# Patient Record
Sex: MALE | Race: WHITE | NOT HISPANIC OR LATINO | Employment: FULL TIME | URBAN - METROPOLITAN AREA
[De-identification: names, ages, dates, MRNs, and addresses within clinical notes are randomized per-mention and may not be internally consistent; named-entity substitution may affect disease eponyms.]

---

## 2024-06-21 ENCOUNTER — TRANSCRIBE ORDERS (OUTPATIENT)
Dept: PHYSICAL THERAPY | Facility: CLINIC | Age: 29
End: 2024-06-21
Payer: OTHER MISCELLANEOUS

## 2024-06-21 DIAGNOSIS — S63.502A LEFT WRIST SPRAIN, INITIAL ENCOUNTER: Primary | ICD-10-CM

## 2024-06-27 ENCOUNTER — TREATMENT (OUTPATIENT)
Dept: PHYSICAL THERAPY | Facility: CLINIC | Age: 29
End: 2024-06-27
Payer: OTHER MISCELLANEOUS

## 2024-06-27 DIAGNOSIS — S63.502A SPRAIN OF LEFT WRIST, INITIAL ENCOUNTER: Primary | ICD-10-CM

## 2024-06-27 DIAGNOSIS — M25.532 LEFT WRIST PAIN: ICD-10-CM

## 2024-07-01 ENCOUNTER — TREATMENT (OUTPATIENT)
Dept: PHYSICAL THERAPY | Facility: CLINIC | Age: 29
End: 2024-07-01
Payer: OTHER MISCELLANEOUS

## 2024-07-01 DIAGNOSIS — M25.532 LEFT WRIST PAIN: ICD-10-CM

## 2024-07-01 DIAGNOSIS — S63.502D SPRAIN OF LEFT WRIST, SUBSEQUENT ENCOUNTER: Primary | ICD-10-CM

## 2024-07-03 ENCOUNTER — TREATMENT (OUTPATIENT)
Dept: PHYSICAL THERAPY | Facility: CLINIC | Age: 29
End: 2024-07-03
Payer: OTHER MISCELLANEOUS

## 2024-07-03 DIAGNOSIS — M25.532 LEFT WRIST PAIN: ICD-10-CM

## 2024-07-03 DIAGNOSIS — S63.502D SPRAIN OF LEFT WRIST, SUBSEQUENT ENCOUNTER: Primary | ICD-10-CM

## 2024-07-16 ENCOUNTER — TREATMENT (OUTPATIENT)
Dept: PHYSICAL THERAPY | Facility: CLINIC | Age: 29
End: 2024-07-16
Payer: OTHER MISCELLANEOUS

## 2024-07-16 DIAGNOSIS — S63.502D SPRAIN OF LEFT WRIST, SUBSEQUENT ENCOUNTER: Primary | ICD-10-CM

## 2024-07-16 DIAGNOSIS — M25.532 LEFT WRIST PAIN: ICD-10-CM

## 2024-08-07 ENCOUNTER — TREATMENT (OUTPATIENT)
Dept: PHYSICAL THERAPY | Facility: CLINIC | Age: 29
End: 2024-08-07
Payer: OTHER MISCELLANEOUS

## 2024-08-07 DIAGNOSIS — M25.532 LEFT WRIST PAIN: ICD-10-CM

## 2024-08-07 DIAGNOSIS — S63.502D SPRAIN OF LEFT WRIST, SUBSEQUENT ENCOUNTER: Primary | ICD-10-CM

## 2024-08-13 ENCOUNTER — OFFICE VISIT (OUTPATIENT)
Dept: ORTHOPEDIC SURGERY | Facility: CLINIC | Age: 29
End: 2024-08-13
Payer: OTHER MISCELLANEOUS

## 2024-08-13 VITALS
WEIGHT: 250 LBS | SYSTOLIC BLOOD PRESSURE: 165 MMHG | BODY MASS INDEX: 35 KG/M2 | OXYGEN SATURATION: 98 % | HEIGHT: 71 IN | HEART RATE: 80 BPM | DIASTOLIC BLOOD PRESSURE: 99 MMHG

## 2024-08-13 DIAGNOSIS — M77.8 LEFT WRIST TENDONITIS: ICD-10-CM

## 2024-08-13 DIAGNOSIS — S63.502A SPRAIN OF LEFT WRIST, INITIAL ENCOUNTER: ICD-10-CM

## 2024-08-13 DIAGNOSIS — M25.532 LEFT WRIST PAIN: Primary | ICD-10-CM

## 2024-08-13 PROCEDURE — 99203 OFFICE O/P NEW LOW 30 MIN: CPT | Performed by: ORTHOPAEDIC SURGERY

## 2024-08-13 RX ORDER — DICLOFENAC SODIUM 75 MG/1
1 TABLET, DELAYED RELEASE ORAL EVERY 12 HOURS SCHEDULED
COMMUNITY
Start: 2024-07-08

## 2024-08-15 ENCOUNTER — TELEPHONE (OUTPATIENT)
Dept: ORTHOPEDIC SURGERY | Facility: CLINIC | Age: 29
End: 2024-08-15
Payer: OTHER MISCELLANEOUS

## 2024-08-15 DIAGNOSIS — M77.8 LEFT WRIST TENDONITIS: ICD-10-CM

## 2024-08-15 DIAGNOSIS — M25.532 LEFT WRIST PAIN: Primary | ICD-10-CM

## 2024-08-15 DIAGNOSIS — S63.502A SPRAIN OF LEFT WRIST, INITIAL ENCOUNTER: ICD-10-CM

## 2024-08-19 ENCOUNTER — TREATMENT (OUTPATIENT)
Dept: PHYSICAL THERAPY | Facility: CLINIC | Age: 29
End: 2024-08-19
Payer: OTHER MISCELLANEOUS

## 2024-08-19 DIAGNOSIS — S63.502D SPRAIN OF LEFT WRIST, SUBSEQUENT ENCOUNTER: Primary | ICD-10-CM

## 2024-08-19 DIAGNOSIS — M25.532 LEFT WRIST PAIN: ICD-10-CM

## 2024-08-19 PROCEDURE — 97112 NEUROMUSCULAR REEDUCATION: CPT | Performed by: PHYSICAL THERAPIST

## 2024-08-19 PROCEDURE — 97110 THERAPEUTIC EXERCISES: CPT | Performed by: PHYSICAL THERAPIST

## 2024-08-19 PROCEDURE — 97530 THERAPEUTIC ACTIVITIES: CPT | Performed by: PHYSICAL THERAPIST

## 2024-08-23 ENCOUNTER — TREATMENT (OUTPATIENT)
Dept: PHYSICAL THERAPY | Facility: CLINIC | Age: 29
End: 2024-08-23
Payer: OTHER MISCELLANEOUS

## 2024-08-23 DIAGNOSIS — S63.502D SPRAIN OF LEFT WRIST, SUBSEQUENT ENCOUNTER: Primary | ICD-10-CM

## 2024-08-23 DIAGNOSIS — M25.532 LEFT WRIST PAIN: ICD-10-CM

## 2024-09-03 ENCOUNTER — TREATMENT (OUTPATIENT)
Dept: PHYSICAL THERAPY | Facility: CLINIC | Age: 29
End: 2024-09-03
Payer: OTHER MISCELLANEOUS

## 2024-09-03 DIAGNOSIS — M25.532 LEFT WRIST PAIN: ICD-10-CM

## 2024-09-03 DIAGNOSIS — S63.502D SPRAIN OF LEFT WRIST, SUBSEQUENT ENCOUNTER: Primary | ICD-10-CM

## 2024-09-03 PROCEDURE — 97112 NEUROMUSCULAR REEDUCATION: CPT | Performed by: PHYSICAL THERAPIST

## 2024-09-03 PROCEDURE — 97110 THERAPEUTIC EXERCISES: CPT | Performed by: PHYSICAL THERAPIST

## 2024-09-03 PROCEDURE — 97530 THERAPEUTIC ACTIVITIES: CPT | Performed by: PHYSICAL THERAPIST

## 2024-09-05 ENCOUNTER — TREATMENT (OUTPATIENT)
Dept: PHYSICAL THERAPY | Facility: CLINIC | Age: 29
End: 2024-09-05
Payer: OTHER MISCELLANEOUS

## 2024-09-05 DIAGNOSIS — S63.502D SPRAIN OF LEFT WRIST, SUBSEQUENT ENCOUNTER: Primary | ICD-10-CM

## 2024-09-05 DIAGNOSIS — M25.532 LEFT WRIST PAIN: ICD-10-CM

## 2024-09-09 ENCOUNTER — TREATMENT (OUTPATIENT)
Dept: PHYSICAL THERAPY | Facility: CLINIC | Age: 29
End: 2024-09-09
Payer: OTHER MISCELLANEOUS

## 2024-09-09 DIAGNOSIS — S63.502D SPRAIN OF LEFT WRIST, SUBSEQUENT ENCOUNTER: Primary | ICD-10-CM

## 2024-09-09 DIAGNOSIS — M25.532 LEFT WRIST PAIN: ICD-10-CM

## 2024-09-09 PROCEDURE — 97112 NEUROMUSCULAR REEDUCATION: CPT | Performed by: PHYSICAL THERAPIST

## 2024-09-09 PROCEDURE — 97110 THERAPEUTIC EXERCISES: CPT | Performed by: PHYSICAL THERAPIST

## 2024-09-09 PROCEDURE — 97530 THERAPEUTIC ACTIVITIES: CPT | Performed by: PHYSICAL THERAPIST

## 2024-09-11 ENCOUNTER — TREATMENT (OUTPATIENT)
Dept: PHYSICAL THERAPY | Facility: CLINIC | Age: 29
End: 2024-09-11
Payer: OTHER MISCELLANEOUS

## 2024-09-11 DIAGNOSIS — M25.532 LEFT WRIST PAIN: ICD-10-CM

## 2024-09-11 DIAGNOSIS — S63.502D SPRAIN OF LEFT WRIST, SUBSEQUENT ENCOUNTER: Primary | ICD-10-CM

## 2024-09-13 ENCOUNTER — OFFICE VISIT (OUTPATIENT)
Dept: ORTHOPEDIC SURGERY | Facility: CLINIC | Age: 29
End: 2024-09-13
Payer: OTHER MISCELLANEOUS

## 2024-09-13 VITALS
SYSTOLIC BLOOD PRESSURE: 130 MMHG | HEART RATE: 71 BPM | HEIGHT: 71 IN | OXYGEN SATURATION: 98 % | WEIGHT: 250 LBS | BODY MASS INDEX: 35 KG/M2 | DIASTOLIC BLOOD PRESSURE: 83 MMHG

## 2024-09-13 DIAGNOSIS — M25.532 LEFT WRIST PAIN: ICD-10-CM

## 2024-09-13 DIAGNOSIS — M77.8 LEFT WRIST TENDONITIS: ICD-10-CM

## 2024-09-13 DIAGNOSIS — S63.502D SPRAIN OF LEFT WRIST, SUBSEQUENT ENCOUNTER: Primary | ICD-10-CM

## 2024-09-13 PROBLEM — S63.502A SPRAIN OF LEFT WRIST: Status: ACTIVE | Noted: 2024-09-13

## 2024-09-16 ENCOUNTER — TREATMENT (OUTPATIENT)
Dept: PHYSICAL THERAPY | Facility: CLINIC | Age: 29
End: 2024-09-16
Payer: OTHER MISCELLANEOUS

## 2024-09-16 DIAGNOSIS — S63.502D SPRAIN OF LEFT WRIST, SUBSEQUENT ENCOUNTER: Primary | ICD-10-CM

## 2024-09-16 DIAGNOSIS — M25.532 LEFT WRIST PAIN: ICD-10-CM

## 2024-09-16 PROCEDURE — 97530 THERAPEUTIC ACTIVITIES: CPT | Performed by: PHYSICAL THERAPIST

## 2024-09-16 PROCEDURE — 97112 NEUROMUSCULAR REEDUCATION: CPT | Performed by: PHYSICAL THERAPIST

## 2024-09-16 PROCEDURE — 97110 THERAPEUTIC EXERCISES: CPT | Performed by: PHYSICAL THERAPIST

## 2024-09-17 ENCOUNTER — TELEPHONE (OUTPATIENT)
Dept: ORTHOPEDIC SURGERY | Facility: CLINIC | Age: 29
End: 2024-09-17
Payer: OTHER MISCELLANEOUS

## 2024-09-18 ENCOUNTER — TREATMENT (OUTPATIENT)
Dept: PHYSICAL THERAPY | Facility: CLINIC | Age: 29
End: 2024-09-18
Payer: OTHER MISCELLANEOUS

## 2024-09-18 DIAGNOSIS — S63.502D SPRAIN OF LEFT WRIST, SUBSEQUENT ENCOUNTER: Primary | ICD-10-CM

## 2024-09-18 DIAGNOSIS — M25.532 LEFT WRIST PAIN: ICD-10-CM

## 2024-09-23 ENCOUNTER — TREATMENT (OUTPATIENT)
Dept: PHYSICAL THERAPY | Facility: CLINIC | Age: 29
End: 2024-09-23
Payer: OTHER MISCELLANEOUS

## 2024-09-23 DIAGNOSIS — M25.532 LEFT WRIST PAIN: ICD-10-CM

## 2024-09-23 DIAGNOSIS — S63.502D SPRAIN OF LEFT WRIST, SUBSEQUENT ENCOUNTER: Primary | ICD-10-CM

## 2024-09-23 PROCEDURE — 97112 NEUROMUSCULAR REEDUCATION: CPT | Performed by: PHYSICAL THERAPIST

## 2024-09-23 PROCEDURE — 97530 THERAPEUTIC ACTIVITIES: CPT | Performed by: PHYSICAL THERAPIST

## 2024-09-23 PROCEDURE — 97110 THERAPEUTIC EXERCISES: CPT | Performed by: PHYSICAL THERAPIST

## 2024-09-24 DIAGNOSIS — S63.502A SPRAIN OF LEFT WRIST, INITIAL ENCOUNTER: ICD-10-CM

## 2024-09-24 DIAGNOSIS — M77.8 LEFT WRIST TENDONITIS: ICD-10-CM

## 2024-09-24 DIAGNOSIS — M25.532 LEFT WRIST PAIN: ICD-10-CM

## 2024-09-25 ENCOUNTER — TREATMENT (OUTPATIENT)
Dept: PHYSICAL THERAPY | Facility: CLINIC | Age: 29
End: 2024-09-25
Payer: OTHER MISCELLANEOUS

## 2024-09-25 DIAGNOSIS — S63.502D SPRAIN OF LEFT WRIST, SUBSEQUENT ENCOUNTER: Primary | ICD-10-CM

## 2024-09-25 DIAGNOSIS — M25.532 LEFT WRIST PAIN: ICD-10-CM

## 2024-09-26 ENCOUNTER — OFFICE VISIT (OUTPATIENT)
Dept: ORTHOPEDIC SURGERY | Facility: CLINIC | Age: 29
End: 2024-09-26
Payer: OTHER MISCELLANEOUS

## 2024-09-26 VITALS
HEART RATE: 83 BPM | SYSTOLIC BLOOD PRESSURE: 151 MMHG | HEIGHT: 71 IN | BODY MASS INDEX: 35 KG/M2 | OXYGEN SATURATION: 97 % | DIASTOLIC BLOOD PRESSURE: 96 MMHG | WEIGHT: 250 LBS

## 2024-09-26 DIAGNOSIS — S63.502D SPRAIN OF LEFT WRIST, SUBSEQUENT ENCOUNTER: ICD-10-CM

## 2024-09-26 DIAGNOSIS — M77.8 LEFT WRIST TENDONITIS: Primary | ICD-10-CM

## 2024-09-26 DIAGNOSIS — M25.532 LEFT WRIST PAIN: ICD-10-CM

## 2024-09-26 RX ORDER — DICLOFENAC SODIUM 75 MG/1
75 TABLET, DELAYED RELEASE ORAL 2 TIMES DAILY
Qty: 60 TABLET | Refills: 2 | Status: SHIPPED | OUTPATIENT
Start: 2024-09-26

## 2024-09-30 ENCOUNTER — TREATMENT (OUTPATIENT)
Dept: PHYSICAL THERAPY | Facility: CLINIC | Age: 29
End: 2024-09-30
Payer: OTHER MISCELLANEOUS

## 2024-09-30 DIAGNOSIS — S63.502D SPRAIN OF LEFT WRIST, SUBSEQUENT ENCOUNTER: Primary | ICD-10-CM

## 2024-09-30 DIAGNOSIS — M25.532 LEFT WRIST PAIN: ICD-10-CM

## 2024-10-02 ENCOUNTER — TREATMENT (OUTPATIENT)
Dept: PHYSICAL THERAPY | Facility: CLINIC | Age: 29
End: 2024-10-02
Payer: OTHER MISCELLANEOUS

## 2024-10-02 DIAGNOSIS — M25.532 LEFT WRIST PAIN: ICD-10-CM

## 2024-10-02 DIAGNOSIS — S63.502D SPRAIN OF LEFT WRIST, SUBSEQUENT ENCOUNTER: Primary | ICD-10-CM

## 2024-10-09 ENCOUNTER — TREATMENT (OUTPATIENT)
Dept: PHYSICAL THERAPY | Facility: CLINIC | Age: 29
End: 2024-10-09
Payer: OTHER MISCELLANEOUS

## 2024-10-09 DIAGNOSIS — M25.532 LEFT WRIST PAIN: ICD-10-CM

## 2024-10-09 DIAGNOSIS — S63.502D SPRAIN OF LEFT WRIST, SUBSEQUENT ENCOUNTER: Primary | ICD-10-CM

## 2024-10-14 ENCOUNTER — TREATMENT (OUTPATIENT)
Dept: PHYSICAL THERAPY | Facility: CLINIC | Age: 29
End: 2024-10-14
Payer: OTHER MISCELLANEOUS

## 2024-10-14 DIAGNOSIS — M25.532 LEFT WRIST PAIN: ICD-10-CM

## 2024-10-14 DIAGNOSIS — S63.502D SPRAIN OF LEFT WRIST, SUBSEQUENT ENCOUNTER: Primary | ICD-10-CM

## 2024-10-14 PROCEDURE — 97110 THERAPEUTIC EXERCISES: CPT | Performed by: PHYSICAL THERAPIST

## 2024-10-14 PROCEDURE — 97112 NEUROMUSCULAR REEDUCATION: CPT | Performed by: PHYSICAL THERAPIST

## 2024-10-14 PROCEDURE — 97530 THERAPEUTIC ACTIVITIES: CPT | Performed by: PHYSICAL THERAPIST

## 2024-10-16 ENCOUNTER — TREATMENT (OUTPATIENT)
Dept: PHYSICAL THERAPY | Facility: CLINIC | Age: 29
End: 2024-10-16
Payer: OTHER MISCELLANEOUS

## 2024-10-16 DIAGNOSIS — M25.532 LEFT WRIST PAIN: ICD-10-CM

## 2024-10-16 DIAGNOSIS — S63.502D SPRAIN OF LEFT WRIST, SUBSEQUENT ENCOUNTER: Primary | ICD-10-CM

## 2024-10-21 ENCOUNTER — TREATMENT (OUTPATIENT)
Dept: PHYSICAL THERAPY | Facility: CLINIC | Age: 29
End: 2024-10-21
Payer: OTHER MISCELLANEOUS

## 2024-10-21 DIAGNOSIS — S63.502D SPRAIN OF LEFT WRIST, SUBSEQUENT ENCOUNTER: Primary | ICD-10-CM

## 2024-10-21 DIAGNOSIS — M25.532 LEFT WRIST PAIN: ICD-10-CM

## 2024-10-21 PROCEDURE — 97530 THERAPEUTIC ACTIVITIES: CPT | Performed by: PHYSICAL THERAPIST

## 2024-10-21 PROCEDURE — 97112 NEUROMUSCULAR REEDUCATION: CPT | Performed by: PHYSICAL THERAPIST

## 2024-10-21 PROCEDURE — 97110 THERAPEUTIC EXERCISES: CPT | Performed by: PHYSICAL THERAPIST

## 2024-10-23 ENCOUNTER — TREATMENT (OUTPATIENT)
Dept: PHYSICAL THERAPY | Facility: CLINIC | Age: 29
End: 2024-10-23
Payer: OTHER MISCELLANEOUS

## 2024-10-23 DIAGNOSIS — M25.532 LEFT WRIST PAIN: ICD-10-CM

## 2024-10-23 DIAGNOSIS — S63.502D SPRAIN OF LEFT WRIST, SUBSEQUENT ENCOUNTER: Primary | ICD-10-CM

## 2024-10-30 ENCOUNTER — TREATMENT (OUTPATIENT)
Dept: PHYSICAL THERAPY | Facility: CLINIC | Age: 29
End: 2024-10-30
Payer: OTHER MISCELLANEOUS

## 2024-10-30 DIAGNOSIS — M25.532 LEFT WRIST PAIN: ICD-10-CM

## 2024-10-30 DIAGNOSIS — S63.502D SPRAIN OF LEFT WRIST, SUBSEQUENT ENCOUNTER: Primary | ICD-10-CM

## 2024-10-30 NOTE — PROGRESS NOTES
Occupational Therapy Daily Treatment Note  [address] [place] [address]      Patient: Dre Luna   : 1995  Referring practitioner: Manuel Freire PA-C  Date of Initial Visit: Type: THERAPY  Noted: 2024  Today's Date: 10/30/2024  Patient seen for 23 sessions         Subjective   Dre Luna reports: actually for the last week the R has been hurting more than the L. No pain today.    Objective          Observations     Additional Wrist/Hand Observation Details  No abnormalities noted    Neurological Testing     Sensation     Wrist/Hand   Left   Intact: light touch    Active Range of Motion     Left Wrist   Wrist flexion: 75 degrees   Wrist extension: 65 degrees   Radial deviation: 20 degrees   Ulnar deviation: 15 degrees     Additional Active Range of Motion Details  Full composite fist, and thumb opposition    Strength/Myotome Testing     Left Wrist/Hand      (2nd hand position)     Trial 1: 92 lbs    Trial 2: 86 lbs    Trial 3: 93 lbs    Average: 90.33 lbs    Thumb Strength  Key/Lateral Pinch     Trial 1: 15 lbs    Trial 2: 16 lbs    Trial 3: 17 lbs    Average: 16 lbs    Right Wrist/Hand      (2nd hand position)     Trial 1: 98 lbs    Trial 2: 101 lbs    Trial 3: 105 lbs    Average: 101.33 lbs    Thumb Strength   Key/Lateral Pinch     Trial 1: 17 lbs    Trial 2: 19 lbs    Trial 3: 20 lbs    Average: 18.67 lbs    Tests     Left Elbow   Negative Tinel's sign (cubital tunnel).     Left Wrist/Hand   Negative Finkelstein's, lunotriquetral shear, scapholunate shear, Tinel's sign (medial nerve) and Tinel's sign (radial tunnel).       See Exercise, Manual, and Modality Logs for complete treatment.     Pt tolerated resisted wrist strengthening today with good tolerance and no complaints of pain.  Assessment/Plan    Visit Diagnoses:    ICD-10-CM ICD-9-CM   1. Sprain of left wrist, subsequent encounter  S63.502D V58.89     842.00   2. Left wrist pain  M25.532 719.43       Continue per  POC         Timed:  Manual Therapy:    0     mins  11527;  Therapeutic Exercise:    10     mins  48927;     Therapeutic Activity:    8     mins  01789;  Ultrasound:     0     mins  85210;    Electrical Stimulation:    0     mins 56130;  Neuromuscular Katlyn:    8    mins  35742;      Timed Treatment:   26   mins   Total Treatment:     26   min    Patricia Alexandra OTR/L  Occupational Therapist    Electronically signed   License number [id number]

## 2024-11-04 ENCOUNTER — TREATMENT (OUTPATIENT)
Dept: PHYSICAL THERAPY | Facility: CLINIC | Age: 29
End: 2024-11-04
Payer: OTHER MISCELLANEOUS

## 2024-11-04 DIAGNOSIS — M25.532 LEFT WRIST PAIN: ICD-10-CM

## 2024-11-04 DIAGNOSIS — S63.502D SPRAIN OF LEFT WRIST, SUBSEQUENT ENCOUNTER: Primary | ICD-10-CM

## 2024-11-04 PROCEDURE — 97110 THERAPEUTIC EXERCISES: CPT | Performed by: PHYSICAL THERAPIST

## 2024-11-04 PROCEDURE — 97112 NEUROMUSCULAR REEDUCATION: CPT | Performed by: PHYSICAL THERAPIST

## 2024-11-04 PROCEDURE — 97530 THERAPEUTIC ACTIVITIES: CPT | Performed by: PHYSICAL THERAPIST

## 2024-11-04 NOTE — PROGRESS NOTES
Occupational Therapy Daily Treatment Note  [address] [place] [id number]      Patient: Dre Luna   : 1995  Referring practitioner: Manuel Freire PA-C  Date of Initial Visit: Type: THERAPY  Noted: 2024  Today's Date: 2024  Patient seen for 24 sessions         Subjective   Dre Luna reports: I have been using it all day and I used it Friday. Friday it did get to where it was burning. It does not hurt any worse than my right wrist. I think I just have bad wrist. I can not put weight on them like doing push ups.    Objective   See Exercise, Manual, and Modality Logs for complete treatment.   Pt tolerated treatment well for strengthening and weight bearing.    Assessment/Plan    Visit Diagnoses:    ICD-10-CM ICD-9-CM   1. Sprain of left wrist, subsequent encounter  S63.502D V58.89     842.00   2. Left wrist pain  M25.532 719.43       Continue per POC.          Timed:  Manual Therapy:    0     mins  43397;  Therapeutic Exercise:    10     mins  13888;     Therapeutic Activity:    10     mins  71077;  Ultrasound:     0     mins  89421;    Electrical Stimulation:    0     mins 11249;  Neuromuscular Katlyn:    10    mins  47545;      Timed Treatment:   30   mins   Total Treatment:     30   min    FREDY Mason/L  Occupational Therapist    Electronically signed   License number [id number]

## 2024-11-21 ENCOUNTER — OFFICE VISIT (OUTPATIENT)
Dept: ORTHOPEDIC SURGERY | Facility: CLINIC | Age: 29
End: 2024-11-21
Payer: OTHER MISCELLANEOUS

## 2024-11-21 VITALS
DIASTOLIC BLOOD PRESSURE: 91 MMHG | HEART RATE: 64 BPM | BODY MASS INDEX: 35 KG/M2 | SYSTOLIC BLOOD PRESSURE: 158 MMHG | HEIGHT: 71 IN | OXYGEN SATURATION: 97 % | WEIGHT: 250 LBS

## 2024-11-21 DIAGNOSIS — S63.502D SPRAIN OF LEFT WRIST, SUBSEQUENT ENCOUNTER: ICD-10-CM

## 2024-11-21 DIAGNOSIS — M77.8 LEFT WRIST TENDONITIS: ICD-10-CM

## 2024-11-21 DIAGNOSIS — M25.532 LEFT WRIST PAIN: Primary | ICD-10-CM

## 2024-11-21 NOTE — PROGRESS NOTES
"Chief Complaint  Follow-up of the Left Wrist    Subjective          History of Present Illness      Dre Luna is a 29 y.o. male  presents to [facility] for     Patient presents for follow-up evaluation of left wrist pain and left wrist sprain/injury this is a Workmen's Comp. case.  At last visit we reviewed an MRI with him.  Patient states he has finished therapy he was released due to his progress.  He denies pain denies swelling, denies need for pain medication or NSAIDs he is happy with his progress he has been doing light duty at work but states that he has eased himself back into his regular work routine over the last 2 weeks without trouble, no new complaints he would like to return to work full duty no restrictions      No Known Allergies     Social History     Socioeconomic History    Marital status: Single   Tobacco Use    Smoking status: Never    Smokeless tobacco: Never   Vaping Use    Vaping status: Never Used   Substance and Sexual Activity    Alcohol use: Never    Drug use: Never    Sexual activity: Defer        REVIEW OF SYSTEMS    Constitutional: Awake alert and oriented x3, no acute distress, denies fevers, chills, weight loss  Respiratory: No respiratory distress  Vascular: Brisk cap refill, Intact distal pulses, No cyanosis, compartments soft with no signs or symptoms of compartment syndrome or DVT.   Cardiovascular: Denies chest pain, shortness of breath  Skin: Denies rashes, acute skin changes  Neurologic: Denies headache, loss of consciousness  MSK: Left wrist pain      Objective   Vital Signs:   /91   Pulse 64   Ht 180.3 cm (70.98\")   Wt 113 kg (250 lb)   SpO2 97%   BMI 34.88 kg/m²     Body mass index is 34.88 kg/m².    Physical Exam       Left wrist: Nontender to palpation, no pain with range of motion no pain with resisted range of motion, full flexion full extension full supination pronation, neurovascularly intact, no pain with resisted range " of motion      Procedures    Imaging Results (Most Recent)       None             Result Review :   The following data was reviewed by: JEREMIAS Mcdaniel on 11/21/2024:               Assessment and Plan    Diagnoses and all orders for this visit:    1. Left wrist pain (Primary)    2. Left wrist tendonitis    3. Sprain of left wrist, subsequent encounter        Discussed diagnosis and treatment options with patient he was advised to continue activity as tolerated he was given a work note to return to work full duty no restriction follow-up as needed    Call or return if worsening symptoms.    Follow Up   Return if symptoms worsen or fail to improve.  Patient was given instructions and counseling regarding his condition or for health maintenance advice. Please see specific information pulled into the AVS if appropriate.       EMR Dragon/Transcription disclaimer:  Part of this note may be an electronic transcription/translation of spoken language to printed text using the Dragon Dictation System

## 2024-11-25 ENCOUNTER — DOCUMENTATION (OUTPATIENT)
Dept: PHYSICAL THERAPY | Facility: CLINIC | Age: 29
End: 2024-11-25
Payer: OTHER MISCELLANEOUS

## 2024-11-25 NOTE — PROGRESS NOTES
Discharge Summary  Discharge Summary from Occupational Therapy Report    Patient Information  Dre Luna  1995    Dates OT visit: 6/27/24-11/4/24  Number of Visits: 24     Discharge Status of Patient:   No pain  Sensation      Wrist/Hand   Left   Intact: light touch     Active Range of Motion      Left Wrist   Wrist flexion: 75 degrees   Wrist extension: 65 degrees   Radial deviation: 20 degrees   Ulnar deviation: 15 degrees      Additional Active Range of Motion Details  Full composite fist, and thumb opposition     Strength/Myotome Testing      Left Wrist/Hand       (2nd hand position)     Trial 1: 92 lbs    Trial 2: 86 lbs    Trial 3: 93 lbs    Average: 90.33 lbs     Thumb Strength  Key/Lateral Pinch     Trial 1: 15 lbs    Trial 2: 16 lbs    Trial 3: 17 lbs    Average: 16 lbs     Right Wrist/Hand       (2nd hand position)     Trial 1: 98 lbs    Trial 2: 101 lbs    Trial 3: 105 lbs    Average: 101.33 lbs     Thumb Strength   Key/Lateral Pinch     Trial 1: 17 lbs    Trial 2: 19 lbs    Trial 3: 20 lbs    Average: 18.67 lbs     Tests      Left Elbow   Negative Tinel's sign (cubital tunnel).      Left Wrist/Hand   Negative Finkelstein's, lunotriquetral shear, scapholunate shear, Tinel's sign (medial nerve) and Tinel's sign (radial tunnel).       Goals: Partially Met    Visit Diagnoses:  No diagnosis found.    Discharge Plan: Continue with current home exercise program as instructed    Comments Pt range of motion and strength WNL's and not having issues at work. Pt released by physician and returning to regular duty work.    Date of Discharge 11/25/24        FREDY Mason/L  Occupational Therapist  License number [id number]